# Patient Record
Sex: FEMALE | URBAN - METROPOLITAN AREA
[De-identification: names, ages, dates, MRNs, and addresses within clinical notes are randomized per-mention and may not be internally consistent; named-entity substitution may affect disease eponyms.]

---

## 2023-08-01 ENCOUNTER — APPOINTMENT (RX ONLY)
Dept: URBAN - METROPOLITAN AREA CLINIC 39 | Facility: CLINIC | Age: 78
Setting detail: DERMATOLOGY
End: 2023-08-01

## 2023-08-01 DIAGNOSIS — D22 MELANOCYTIC NEVI: ICD-10-CM

## 2023-08-01 DIAGNOSIS — D18.0 HEMANGIOMA: ICD-10-CM

## 2023-08-01 DIAGNOSIS — L81.4 OTHER MELANIN HYPERPIGMENTATION: ICD-10-CM

## 2023-08-01 PROBLEM — D18.01 HEMANGIOMA OF SKIN AND SUBCUTANEOUS TISSUE: Status: ACTIVE | Noted: 2023-08-01

## 2023-08-01 PROBLEM — D22.5 MELANOCYTIC NEVI OF TRUNK: Status: ACTIVE | Noted: 2023-08-01

## 2023-08-01 PROCEDURE — ? COUNSELING

## 2023-08-01 PROCEDURE — ? FULL BODY SKIN EXAM

## 2023-08-01 PROCEDURE — 99203 OFFICE O/P NEW LOW 30 MIN: CPT

## 2023-08-01 PROCEDURE — ? SUNSCREEN RECOMMENDATIONS

## 2023-08-01 ASSESSMENT — LOCATION SIMPLE DESCRIPTION DERM
LOCATION SIMPLE: RIGHT FOREARM
LOCATION SIMPLE: ABDOMEN
LOCATION SIMPLE: LEFT THIGH
LOCATION SIMPLE: LEFT UPPER BACK

## 2023-08-01 ASSESSMENT — LOCATION DETAILED DESCRIPTION DERM
LOCATION DETAILED: PERIUMBILICAL SKIN
LOCATION DETAILED: LEFT ANTERIOR PROXIMAL THIGH
LOCATION DETAILED: LEFT INFERIOR UPPER BACK
LOCATION DETAILED: RIGHT PROXIMAL RADIAL DORSAL FOREARM

## 2023-08-01 ASSESSMENT — LOCATION ZONE DERM
LOCATION ZONE: LEG
LOCATION ZONE: ARM
LOCATION ZONE: TRUNK

## 2023-08-01 NOTE — PROCEDURE: MIPS QUALITY
Quality 111:Pneumonia Vaccination Status For Older Adults: Patient received any pneumococcal conjugate or polysaccharide vaccine on or after their 60th birthday and before the end of the measurement period
Detail Level: Generalized
Quality 130: Documentation Of Current Medications In The Medical Record: Current Medications Documented
Quality 110: Preventive Care And Screening: Influenza Immunization: Influenza Immunization Administered during Influenza season
Quality 431: Preventive Care And Screening: Unhealthy Alcohol Use - Screening: Patient identified as an unhealthy alcohol user when screened for unhealthy alcohol use using a systematic screening method and received brief counseling
Quality 226: Preventive Care And Screening: Tobacco Use: Screening And Cessation Intervention: Patient screened for tobacco use and is an ex/non-smoker

## 2024-11-06 ENCOUNTER — CARE COORDINATION (OUTPATIENT)
Dept: OTHER | Facility: CLINIC | Age: 79
End: 2024-11-06

## 2024-11-06 RX ORDER — ATORVASTATIN CALCIUM 20 MG/1
20 TABLET, FILM COATED ORAL DAILY
COMMUNITY

## 2024-11-06 RX ORDER — OLMESARTAN MEDOXOMIL AND HYDROCHLOROTHIAZIDE 40/12.5 40; 12.5 MG/1; MG/1
1 TABLET ORAL DAILY
COMMUNITY

## 2024-11-06 NOTE — CARE COORDINATION
Ambulatory Care Coordination Note     2024 2:49 PM     Patient Current Location:  [place]     This patient was received as a referral from Population health report .    ACM contacted the patient by telephone. Verified name and  with patient as identifiers. Provided introduction to self, and explanation of the ACM role.   Patient accepted care management services at this time.          ACM: Rossy Jeong RN     Challenges to be reviewed by the provider   Additional needs identified to be addressed with provider No  none               Method of communication with provider: chart routing.    Utilization: N/A - Initial Call     Care Summary Note: Patient is agreeable to HR program and states her primary health concern is her \"sanity.\" Pt's adult daughter lives with her and has behavioral health conditions causing strain on pt's own mental health, ability to sleep and work. Pt placed her child on phone with ACM and ACM de-escalated conflict which was occurring at time of phone call. Pt provided thorough medical background and medication review completed. She sees PCP Dr. Looney in [place] ([phone]). Pt has periods of incontinence and has history of frequent UTIs. ACM provided education on UTI prevention and she does take cranberry supplements. Pt declines to use Estrace cream due to risk of cancer on medication tube. Explained risk of untreated UTIs including MDROs and possible sepsis over time. Most recent [facility] involved shoulder/arm pain and she was referred to [facility]. Pt went to appt and received exercises to do. Pt states her pain has subsided since 3 days after appt. Pt is open and agreeable to  referral to discuss BH options for her daughter. Pt states she is \"willing to try anything.\" She has crisis line phone number and states she has used many times. Encouraged self-care activities and taking care of her own health. ACM will make referral to  and

## 2024-11-08 ENCOUNTER — CARE COORDINATION (OUTPATIENT)
Dept: OTHER | Facility: CLINIC | Age: 79
End: 2024-11-08

## 2024-11-08 NOTE — CARE COORDINATION
The patient has been identified for outreach by this MSW to provide support and address specific needs. The outreach will focus on engaging the patient to ensure they have access to necessary resources and assistance.      Margaux Goodman, DIANA, [facility]  , [phone]

## 2024-11-12 ENCOUNTER — CARE COORDINATION (OUTPATIENT)
Dept: OTHER | Facility: CLINIC | Age: 79
End: 2024-11-12

## 2024-11-12 NOTE — CARE COORDINATION
Care Coordination MSW Documentation    2024 5:00 PM    MSW contacted patient for evaluation .  Patient identifiers confirmed, zip code and .  Referral sent to  by Nurse Adenike PLASENCIA Wendy, RN.      Purpose of TC  MSW outreached the patient to follow up on social work care coordination. The patient is open to a  referral to explore behavioral health options for her daughter, expressing willingness to try anything to improve the situation.     Patient concerns  She has significant concerns about her daughter's behavior, including suicidal ideation, repeated hospitalizations, and past traumatic experiences with psychiatric intake processes, which have contributed to her deep distrust of healthcare professionals. The patient daughter feels isolated, living in a tense and emotionally challenging environment, and struggles with a lack of support, as well as difficulty trusting anyone in the [place] due to her past experiences and cultural disconnection. All in which has caused some stressors within the family dynamic.     The patient doesn't believe theres any help for her daughter and daughter doesn't want help within the [place] but in [place]. The dtrs current doctor that prescribes her medication [facility].   MSW learned a lot during the conversation and provided recommendations. Patient stated have  done some of the same recommendations in the past.     [place] Mobile Crisis Teams: These teams provide in-the-moment, on-site mental health support and can help assess the situation without necessarily bringing someone to the hospital.  PITA [place]: [employer] [place] [employer] Mental [employer]ness offers support groups for individuals with mental health conditions and their families.    MSW will research and find other alternatives to provide support and assistance.         The patient adopted her dtr from [place] at 20 months old and began receiving mental health services at the age of 9. This early

## 2024-11-15 ENCOUNTER — CARE COORDINATION (OUTPATIENT)
Dept: OTHER | Facility: CLINIC | Age: 79
End: 2024-11-15

## 2024-11-15 SDOH — SOCIAL STABILITY: SOCIAL NETWORK: IN A TYPICAL WEEK, HOW MANY TIMES DO YOU TALK ON THE PHONE WITH FAMILY, FRIENDS, OR NEIGHBORS?: NEVER

## 2024-11-15 SDOH — ECONOMIC STABILITY: FOOD INSECURITY: WITHIN THE PAST 12 MONTHS, YOU WORRIED THAT YOUR FOOD WOULD RUN OUT BEFORE YOU GOT MONEY TO BUY MORE.: NEVER TRUE

## 2024-11-15 SDOH — HEALTH STABILITY: PHYSICAL HEALTH: ON AVERAGE, HOW MANY DAYS PER WEEK DO YOU ENGAGE IN MODERATE TO STRENUOUS EXERCISE (LIKE A BRISK WALK)?: 1 DAY

## 2024-11-15 SDOH — SOCIAL STABILITY: SOCIAL NETWORK
DO YOU BELONG TO ANY CLUBS OR ORGANIZATIONS SUCH AS CHURCH GROUPS UNIONS, FRATERNAL OR ATHLETIC GROUPS, OR SCHOOL GROUPS?: YES

## 2024-11-15 SDOH — ECONOMIC STABILITY: INCOME INSECURITY: IN THE LAST 12 MONTHS, WAS THERE A TIME WHEN YOU WERE NOT ABLE TO PAY THE MORTGAGE OR RENT ON TIME?: YES

## 2024-11-15 SDOH — HEALTH STABILITY: MENTAL HEALTH: HOW MANY STANDARD DRINKS CONTAINING ALCOHOL DO YOU HAVE ON A TYPICAL DAY?: 1 OR 2

## 2024-11-15 SDOH — ECONOMIC STABILITY: INCOME INSECURITY: HOW HARD IS IT FOR YOU TO PAY FOR THE VERY BASICS LIKE FOOD, HOUSING, MEDICAL CARE, AND HEATING?: VERY HARD

## 2024-11-15 SDOH — HEALTH STABILITY: MENTAL HEALTH
STRESS IS WHEN SOMEONE FEELS TENSE, NERVOUS, ANXIOUS, OR CAN'T SLEEP AT NIGHT BECAUSE THEIR MIND IS TROUBLED. HOW STRESSED ARE YOU?: RATHER MUCH

## 2024-11-15 SDOH — SOCIAL STABILITY: SOCIAL NETWORK: ARE YOU MARRIED, WIDOWED, DIVORCED, SEPARATED, NEVER MARRIED, OR LIVING WITH A PARTNER?: NEVER MARRIED

## 2024-11-15 SDOH — ECONOMIC STABILITY: FOOD INSECURITY: WITHIN THE PAST 12 MONTHS, THE FOOD YOU BOUGHT JUST DIDN'T LAST AND YOU DIDN'T HAVE MONEY TO GET MORE.: NEVER TRUE

## 2024-11-15 SDOH — HEALTH STABILITY: MENTAL HEALTH: HOW OFTEN DO YOU HAVE A DRINK CONTAINING ALCOHOL?: MONTHLY OR LESS

## 2024-11-15 SDOH — SOCIAL STABILITY: SOCIAL NETWORK: HOW OFTEN DO YOU GET TOGETHER WITH FRIENDS OR RELATIVES?: NEVER

## 2024-11-15 SDOH — SOCIAL STABILITY: SOCIAL NETWORK: HOW OFTEN DO YOU ATTEND CHURCH OR RELIGIOUS SERVICES?: NEVER

## 2024-11-15 SDOH — SOCIAL STABILITY: SOCIAL NETWORK: HOW OFTEN DO YOU ATTENT MEETINGS OF THE CLUB OR ORGANIZATION YOU BELONG TO?: MORE THAN 4 TIMES PER YEAR

## 2024-11-15 SDOH — HEALTH STABILITY: PHYSICAL HEALTH: ON AVERAGE, HOW MANY MINUTES DO YOU ENGAGE IN EXERCISE AT THIS LEVEL?: 30 MIN

## 2024-11-15 NOTE — CARE COORDINATION
Ambulatory Care Coordination Note     11/15/2024 2:42 PM     Patient Current Location:  [place]     ACM contacted the patient by telephone. Verified name and  with patient as identifiers.         ACM: Rossy Jeong RN     Challenges to be reviewed by the provider   Additional needs identified to be addressed with provider No  none               Method of communication with provider: chart routing.    Utilization: Patient has not had any utilization since our last call.     Care Summary Note: Patient reports her adult child's verbally aggressive behavior waxes and wanes. She is at work today and spoke with ACM, giving time for therapeutic listening. ACM offered suggestions for maintaining healthy boundaries in her home and alerting authorities for threats of any harm. Pt states PCP will not prescribe medications for depression and wants her to see a Psychiatrist for this. She does speak with a counselor virtually and states psychiatrist was offered but not successful in establishing care. ACM offered suggestion of primary care offerings at [facility] and will consider as she was pleased with care in the past for acute need from Dr. Trent Self. ACM provided office hours over the following week and offered to contact [facility] to help provide a smooth initial visit. Patient will consider this and let ACM know. Pt completed SDOH assessment and needs were identified. Pt is agreeable to SW researching any other options which may be of benefit for her in addition to further  resources for her child. Pt works as a relator and at a store part-time. She also mentioned uncertainty about ACP documents with respect to health care decision-maker. Pt is agreeable to CM follow-up call in 2-3 weeks and expressed thanks for the call.       Offered patient enrollment in the Remote Patient Monitoring (RPM) program for in-home monitoring: Patient is not eligible for RPM program because: affiliate provider.

## 2024-11-20 ENCOUNTER — CARE COORDINATION (OUTPATIENT)
Dept: OTHER | Facility: CLINIC | Age: 79
End: 2024-11-20

## 2024-11-20 NOTE — CARE COORDINATION
Ambulatory Care Coordination Note     11/20/2024 2:22 PM     Pt returned call and expressed worry about daughter's mental health. Pt states she called crisis line but this was no help. Pt declined speaking with SW at this time. ACM provided active and empathetic listening, offering suggestions for encouraging her daughter to adhere to medication regimen, take PRNs as needed, and to contact her  prescriber to request med review. Discussed safety and contacting authorities if pt feels her daughter is a harm to her self or others. Pt. expressed thanks for call. Encouraged pt to reach out as needed.     Rossy Jeong RN BSN  [phone]

## 2024-11-20 NOTE — CARE COORDINATION
Ambulatory Care Coordination Note     11/20/2024 1:25 PM     Returned call to pt and she asked if she can return my call in 15 minutes. Will await pt's call.    Rossy Jeong RN BSN  [phone]

## 2024-11-21 ENCOUNTER — CARE COORDINATION (OUTPATIENT)
Dept: OTHER | Facility: CLINIC | Age: 79
End: 2024-11-21

## 2024-11-21 NOTE — CARE COORDINATION
Care Coordination MSW Documentation    2024 2:46 PM    MSW contacted patient for follow up.  Patient identifiers confirmed, zip code and .  Patient referred by Nurse Adenike PLASENCIA Wendy, RN.      Purpose of TC  MSW outreached the patient to follow up on social work care coordination. MSW inquired about financial assistance needs, assess any ongoing social support concerns, and provide guidance on accessing community resources.     TC Details  The patient shared that it has been challenging to talk at home, as she is unsure when her daughter may be listening. She disclosed that her daughter has expressed suicidal ideation (SI) and [facility] support, but she reported they were unable to assist her. The patient described yesterdays incident where her daughter entered the room, began screaming, took the phone from the patient, and yelled at the hotline representatives, stating it was because they were not . He expressed that this situation has been ongoing for three days, leading to a pounding headache and difficulty concentrating.    The daughter stated that she would like to involve a  to help address the situation. The MSW asked how she could assist both the patient and her daughter, exploring ways to provide support, connect them to appropriate resources, and help create a more stable and communicative environment. The MSW acknowledged the stress both parties are experiencing and discussed potential next steps to address their needs, including mediation or family support services in and out of state.    Plan of action  Identify and recommend a trained  or family therapist to facilitate communication between the patient and her daughter. Find outside resources. MSW provided contact information for future needs. Plan for follow-up call in 10-14 days

## 2024-11-25 ENCOUNTER — CARE COORDINATION (OUTPATIENT)
Dept: OTHER | Facility: CLINIC | Age: 79
End: 2024-11-25

## 2024-11-25 NOTE — CARE COORDINATION
Care Coordination MSW Documentation    2024 2:22 PM    MSW contacted patient for follow up.  Patient identifiers confirmed, zip code and .  Patient referred by Nurse Adenike PLASENCIA Wendy, RN.      Purpose of TC  MSW will text patient with requested resources that can help with daughters mental health.     TC Details  Hello,   I hope this message finds you well. I wanted to share some resources for Partial Hospitalization Programs (PHPs):  [facility]:  Offers PHPs for adults, adolescents, and children.  Locations in [place], including [place] and [place].  Phone: [phone]  Website: [email or web address]  [facility]:  Provides PHPs and other mental health and substance use services for teens and young adults.  Website: [email or web address]  Please feel free to reach out if you have any questions or need additional assistance.  Best regards,    DIANA Spaulding, [facility]  Social Work Care Coordinator      Plan of action  MSW provided contact information for future needs. Plan for follow-up call in 7-10 days

## 2024-12-12 ENCOUNTER — CARE COORDINATION (OUTPATIENT)
Dept: OTHER | Facility: CLINIC | Age: 79
End: 2024-12-12

## 2024-12-12 NOTE — CARE COORDINATION
Ambulatory Care Coordination Note     12/12/2024 10:52 AM     Patient outreach attempt by this ACM today to perform care management follow up . ACM was unable to reach the patient by telephone today;   left voice message requesting a return phone call to this ACM.     ACM: Rossy Jeong RN     Care Summary Note: Unable to reach patient at this time.        Follow Up:   Plan for next ACM outreach in approximately 1 month to complete:  - goal progression.     Rossy Jeong RN BSN  [phone]

## 2024-12-12 NOTE — CARE COORDINATION
Ambulatory Care Coordination Note     2024 3:53 PM     Patient Current Location:  [place]     ACM contacted the patient by telephone. Verified name and  with patient as identifiers.         ACM: Rossy Jeong RN     Challenges to be reviewed by the provider   Additional needs identified to be addressed with provider No  none               Method of communication with provider: phone.    Utilization: Patient has not had any utilization since our last call.     Care Summary Note: Pt continues to state her main concern is the mental well-being of her daughter and herself. Pt states no change in her daughter's behavioral status. Discussed coping mechanisms and pt reports getting involved again in PITA group and attending senior center activities. Pt speaks with her counselor weekly. ACM provided empathic listening and pt expressed thanks for time and the call. Pt states no physical issues at this time. Pt is agreeable to follow-up call in one month.     Offered patient enrollment in the Remote Patient Monitoring (RPM) program for in-home monitoring: Patient is not eligible for RPM program because: affiliate provider.     Assessments Completed:   General Assessment    Do you have any symptoms that are causing concern?: No          Medications Reviewed:   Completed during a previous call     Advance Care Planning:   Reviewed during previous call      Care Planning:    Goals Addressed                   This Visit's Progress     Conditions and Symptoms   On track     I will schedule office visits, as directed by my provider.  I will keep my appointment or reschedule if I have to cancel.  I will notify my provider of any barriers to my plan of care.  I will notify my provider of any symptoms that indicate a worsening of my condition.    Barriers: lack of support, overwhelmed by complexity of regimen, and stress  Plan for overcoming my barriers: working with ACM  Confidence: 8/10  Anticipated Goal Completion

## 2025-01-02 ENCOUNTER — CARE COORDINATION (OUTPATIENT)
Dept: OTHER | Facility: CLINIC | Age: 80
End: 2025-01-02

## 2025-01-02 NOTE — CARE COORDINATION
MSW contacted patient for follow up.  Patient identifiers confirmed, zip code and .  Patient referred by Nurse Adenike PLASENCIA Wendy, RN.        Purpose of TC  MSW outreach to patient to follow up after receiving referral that patient would like community resources due to daughter mental health and needing adequte resources.    TC Details  MSW attempted to contact the patient via phone, but the call was not answered. MSW left a HIPAA compliant Vmail.         Plan of action  MSW will make attempt to follow up next week. Plan for follow-up call in 5-7 days

## 2025-01-09 ENCOUNTER — CARE COORDINATION (OUTPATIENT)
Dept: OTHER | Facility: CLINIC | Age: 80
End: 2025-01-09

## 2025-01-09 NOTE — CARE COORDINATION
Care Coordination MSW Documentation    2025 12:50 PM    MSW contacted patient for follow up.  Patient identifiers confirmed, zip code and .  Patient referred by Nurse Adenike PLASENCIA Wendy, RN.      Purpose of TC  MSW attempted to outreach the patient to follow up on social work care coordination. MSW to inquired about assistance needs, assess any ongoing social support concerns, and provide guidance on accessing community resources.     TC Details  Left a voicemail requesting a callback to further discuss the patient's current situation and ensure continuity of care.    Below are the resources:     Medical-Legal Partnerships (MLPs) integrate legal services into healthcare settings to address social determinants affecting health. In [place], several MLPs provide services that might be accessible from [place]:  1. Medical-Legal Partnership Clinic at the [employer]  Services: Offers free civil legal services to low-income individuals living with HIV, addressing issues like disability benefits, custody, employment discrimination, and advance care planning.  Location: [place], [place]  Contact: Phone: [phone]  Website: Medical-Legal Partnership Clinic  2. [employer] (Health, Education, Advocacy, and Law) at the [facility]  Services: Provides advocacy and legal services for children with disabilities and their families, focusing on special education, healthcare access, and family law.  Location: [place], [place]  Contact: Phone: [phone]  Website: [employer]  3.  [facility] for Medical-Legal Partnership  Services: Provides resources and support for establishing and maintaining MLPs nationwide.  Website: [facility] for Medical-Legal Partnership    Plan of action  These resources can be provided by myself or [employer] during next outreach. MSW contact information for future needs. Plan for follow-up call in 7-10 days

## 2025-01-15 ENCOUNTER — CARE COORDINATION (OUTPATIENT)
Dept: OTHER | Facility: CLINIC | Age: 80
End: 2025-01-15

## 2025-01-15 NOTE — CARE COORDINATION
Ambulatory Care Coordination Note     1/15/2025 2:06 PM     Patient Current Location:  [place]     ACM contacted the patient by telephone. Verified name and  with patient as identifiers.         ACM: Rossy Jeong RN     Challenges to be reviewed by the provider   Additional needs identified to be addressed with provider No  none               Method of communication with provider: chart routing.    Utilization: Patient has not had any utilization since our last call.     Care Summary Note: Pt states no changes to situation with challenges concerning her adult daughter. Pt agreed to one-time email for AC to send her resources provided by . Pt agreeable to follow-up call in one month.    Offered patient enrollment in the Remote Patient Monitoring (RPM) program for in-home monitoring: Patient is not eligible for RPM program because: affiliate provider.     Assessments Completed:   General Assessment    Do you have any symptoms that are causing concern?: No          Medications Reviewed:   Completed during a previous call     Advance Care Planning:   Reviewed during previous call      Care Planning:   Not completed during this call      Follow Up:   Plan for next ACM outreach in approximately 1 month to complete:  - goal progression  - resources emailed .   Patient  is agreeable to this plan.     Rossy Jeong RN BSN  Ambulatory Care Manager  [phone]  [email or web address]

## 2025-01-15 NOTE — CARE COORDINATION
Ambulatory Care Coordination Note     1/15/2025 2:13 PM     Emailed resources to pt with her permission:    Ariel Alford,  Here are the resources I mentioned on our call today, which our  has researched.  I hope they provide you with some helpful support.  Please reach out if there's anything else we can assist you with.    Medical-Legal Partnerships (MLPs) integrate legal services into healthcare settings to address social determinants affecting health. In [place], several MLPs provide services that might be accessible from [place]:  1. Medical-Legal Partnership Clinic at the [facility] [employer]  Services: Offers free civil legal services to low-income individuals living with HIV, addressing issues like disability benefits, custody, employment discrimination, and advance care planning.  Location: [place], [place]  Contact: Phone: [phone]  Website: Medical-Legal Partnership Clinic  2. [employer] (Health, Education, Advocacy, and Law) at the [facility]  Services: Provides advocacy and legal services for children with disabilities and their families, focusing on special education, healthcare access, and family law.  Location: [place], [place]  Contact: Phone: [phone]  Website: [employer]  3. National [facility] for Medical-Legal Partnership  Services: Provides resources and support for establishing and maintaining MLPs nationwide.  Website: [facility] for Medical-Legal Partnership  Thank you,    Rossy Jeong RN BSN  Ambulatory Care Manager  [facility]   Cell [phone]  [email or web address]

## 2025-01-29 ENCOUNTER — CARE COORDINATION (OUTPATIENT)
Dept: OTHER | Facility: CLINIC | Age: 80
End: 2025-01-29

## 2025-01-29 NOTE — CARE COORDINATION
Care Coordination MSW Documentation    1/29/2025 2:49 PM      MSW made 2nd attempted to outreach/contact the patient to follow up on social work care coordination. The call was intended to check in on housing or financial assistance needs, assess any ongoing social support concerns, and provide guidance on accessing community resources. MSW left a HIPAA compliant voicemail as the Will attempt to follow up at a later time.       DIANA Spaulding, [facility]  , [phone]

## 2025-02-20 ENCOUNTER — CARE COORDINATION (OUTPATIENT)
Dept: OTHER | Facility: CLINIC | Age: 80
End: 2025-02-20

## 2025-02-20 NOTE — CARE COORDINATION
Ambulatory Care Coordination Note     2/20/2025 10:28 AM     Called pt and she states \"things are going to hell very quickly, but I don't have time to talk today.\" Pt requested ACM call her tomorrow. Will plan to outreach on 02/21/25.    Rossy Jeong RN BSN  [phone]

## 2025-02-21 ENCOUNTER — CARE COORDINATION (OUTPATIENT)
Dept: OTHER | Facility: CLINIC | Age: 80
End: 2025-02-21

## 2025-02-21 NOTE — CARE COORDINATION
Ambulatory Care Coordination Note     2/21/2025 12:55 PM     Patient outreach attempt by this ACM today to perform care management follow up . ACM was unable to reach the patient by telephone today;   left voice message requesting a return phone call to this ACM.     ACM: Rossy Jeong RN     Care Summary Note: Unable to reach patient at this time.          Follow Up:   Plan for next ACM outreach in approximately 2 weeks to complete:  - goal progression.      Rossy Jeong RN BSN  [phone]

## 2025-02-27 ENCOUNTER — CARE COORDINATION (OUTPATIENT)
Dept: OTHER | Facility: CLINIC | Age: 80
End: 2025-02-27

## 2025-02-27 NOTE — CARE COORDINATION
MSW made an outreach attempt today to perform a care management follow-up. MSW was unable to reach the patient by telephone and left a voicemail requesting a return call.    Patient was closed from the Social Work Care Management program on  2/27/2025 due to disengagement. At this time, the patient is deemed able to self-manage.    MSW provided contact information for future needs. No further follow-up call indicated

## 2025-03-06 ENCOUNTER — CARE COORDINATION (OUTPATIENT)
Dept: OTHER | Facility: CLINIC | Age: 80
End: 2025-03-06

## 2025-03-06 NOTE — CARE COORDINATION
Ambulatory Care Coordination Note     3/6/2025 1:43 PM     Pt states she is getting ready to leave for work and her knee is bothering her, and that issues at home are affecting her health. Pt states she is able to talk with ACM on Tuesday between noon and 2pm. Will plan to outreach at that time.    Rossy Jeong RN BSN  [phone]

## 2025-03-11 ENCOUNTER — CARE COORDINATION (OUTPATIENT)
Dept: OTHER | Facility: CLINIC | Age: 80
End: 2025-03-11

## 2025-03-11 NOTE — CARE COORDINATION
Ambulatory Care Coordination Note     3/11/2025 2:06 PM     Pt requested one time email:    Ariel Alford,  Here is the link to see the provider schedule for the [place] location.     [email or web address]    When you have a date and time of when you'd like to visit, just let me know, and I will notify them of when you plan to come in.     Hope you have a good day,     Rossy Jeong RN BSN  Ambulatory Care Manager  [facility] [phone]  [email or web address]

## 2025-03-11 NOTE — CARE COORDINATION
Ambulatory Care Coordination Note     3/11/2025 1:42 PM     Patient Current Location:  [place]     ACM contacted the patient by telephone. Verified name and  with patient as identifiers.         ACM: Rossy Jeong RN     Challenges to be reviewed by the provider   Additional needs identified to be addressed with provider Yes  bilateral knee and leg pain, not sleeping through the night due to urinary frequency, ears feeling \"plugged\"               Method of communication with provider: staff message.    Utilization: Patient has not had any utilization since our last call.     Care Summary Note: Pt states she is \"doing very badly, and in a bad place physically and mentally.\" She reports having bilateral knee and leg pain, not sleeping through the night due to urinary frequency, and ears feeling \"plugged,\" in addition to high levels of stress related to adult child's mental health issues. 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 Pt denies pain or burning with urination and does not think she has a UTI as she has had many in the past and is taking cranberry supplements. Pt states she gets up to urinate at least every two hours through the night. This prevents restful sleep in addition to adult child waking her up \"to yell at her.\" Pt states her hearing is not affected, but feels her ears are \"plugged\" at times, and wonders if stress is causing this. ACM explained this could be related, but best to be assessed by a health care provider. Discussed option of [facility] and encouraged pt to consider them for primary care as well, for improved care coordination and access to care. Pt is agreeable to considering this. Pt is closest to [facility] and pt requested link be sent to her via email to see provider schedule. Once pt has decided on a time to visit, she will

## 2025-03-25 ENCOUNTER — CARE COORDINATION (OUTPATIENT)
Dept: OTHER | Facility: CLINIC | Age: 80
End: 2025-03-25

## 2025-03-25 NOTE — CARE COORDINATION
Provider: Yadira  Week 1 - Initial Assessment     Do you have all of your prescriptions and are they filled?: Yes  Barriers to medication adherence: Does not feel there is a need/No perceived effect  Are you able to afford your medications?: Yes  How often do you have trouble taking your medications the way you have been told to take them?: I always take them as prescribed.        Ability to seek help/take action for Emergent Urgent situations i.e. fire, crime, inclement weather or health crisis.: Independent  Ability to ambulate to restroom: Independent  Ability handle personal hygeine needs (bathing/dressing/grooming): Independent  Ability to manage Medications: Independent  Ability to prepare Food Preparation: Independent  Ability to maintain home (clean home, laundry): Independent  Ability to drive and/or has transportation: Independent  Ability to do shopping: Independent  Ability to manage finances: Independent  Is patient able to live independently?: Yes     Current Housing: Private Residence  Who do you live with?: Child  Are you an active caregiver in your home?: Yes  For whom are you the caregiver?: Adult daughter  Does the person that you care for see a [facility] PCP?: No                 Thinking about your patient's physical health needs, are there any symptoms or problems (risk indicators) you are unsure about that require further investigation?: Moderate to severe symptoms or problems that impact on daily life   Are the patient’s physical health problems impacting on their mental well-being?: Moderate to severe impact upon mental well-being and preventing enjoyment of usual activities   Are there any problems with your patient’s lifestyle behaviors (alcohol, drugs, diet, exercise) that are impacting on physical or mental well-being?: Moderate to severe impact on well-being, preventing enjoyment of usual activities   Do you have any other concerns about your patient’s mental well-being? How would you rate their

## 2025-04-22 ENCOUNTER — CARE COORDINATION (OUTPATIENT)
Dept: OTHER | Facility: CLINIC | Age: 80
End: 2025-04-22

## 2025-04-22 NOTE — CARE COORDINATION
Ambulatory Care Coordination Note     4/22/2025 12:58 PM     Called and spoke with Charlene at [facility] to inquire about Reasonable Accommodations Questionnaire. Paperwork is pending signature from provider. AC requested they call pt as she will come in to  when ready.     Rossy Jeong RN BSN  [phone]

## 2025-04-22 NOTE — CARE COORDINATION
Ambulatory Care Coordination Note     4/22/2025 1:05 PM     Called pt and left message informing her that Reasonable Accommodations paperwork is pending signature from physician and ACM requested they call her cell phone number to let her know when ready, so she may pick it up. Asked pt to call if any questions.    Rossy Jeong RN BSN  [phone]

## 2025-04-22 NOTE — CARE COORDINATION
Ambulatory Care Coordination Note     2025 12:45 PM     Patient Current Location:  [place]     ACM contacted the patient by telephone. Verified name and  with patient as identifiers.         ACM: Rossy Jeong RN     Challenges to be reviewed by the provider   Additional needs identified to be addressed with provider No  none               Method of communication with provider: chart routing.    Utilization: Patient has not had any utilization since our last call.     Care Summary Note: Pt reports she continues to have bilateral knee pain and she has followed up with [facility]. Pt will start PT on 25 2x/week. Pt is having difficulty standing for long periods of time at her second job where she stands for 6 hours at a time. She has requested Orthopedics complete a Reasonable Accommodations Questionnaire so she may send in to her employer. Pt is agreeable to [facility] calling Ortho office ([phone]) to inquire if this is ready for . Pt also states she is having to get up at night about every hour to urinate. She denies pain or burning with urination and states she has seen Urology in the past. AC recommended she schedule follow-up appt with Urology. Also discusses avoiding drinking fluids in the evening before bed, as well as limiting caffeine intake. Pt was thankful for assistance, Will plan to follow-up with pt once information obtained from Orthopedics.     Offered patient enrollment in the Remote Patient Monitoring (RPM) program for in-home monitoring: Patient is not eligible for RPM program because: affiliate provider.     Assessments Completed:   General Assessment    Do you have any symptoms that are causing concern?: No          Medications Reviewed:   Completed during a previous call     Advance Care Planning:   Reviewed during previous call      Care Planning:    Goals Addressed                   This Visit's Progress     Conditions and Symptoms   On track     I will

## 2025-05-21 ENCOUNTER — CARE COORDINATION (OUTPATIENT)
Dept: OTHER | Facility: CLINIC | Age: 80
End: 2025-05-21

## 2025-05-21 NOTE — CARE COORDINATION
Ambulatory Care Coordination Note     2025 1:39 PM     Patient Current Location:  [place]     Patient contacted the ACM by telephone. Verified name and  with patient as identifiers.         ACM: Rossy Jeong RN     Challenges to be reviewed by the provider   Additional needs identified to be addressed with provider No  none               Method of communication with provider: chart routing.    Utilization: Patient has not had any utilization since our last call.     Care Summary Note: Pt reports she is going to PT now for knee pain and she does not feel this is helpful yet, but will continue with treatment. Pt states she does not have time to complete HEP and continues to have emotional disturbances due to concerns with adult daughter's behavior. Pt states she did receive paperwork back from Orthopedics for reasonable accommodations for her job at [employer]. She is waiting for response from her employer. Pt reports she continues to have frequent urination at night, waking 3-4 times per night. She does not feel symptoms are as troublesome during the day. ACM provided education on new medications/treatment options and encouraged her to follow-up again with Urology. Pt requested information be emailed to her along with Urologists in [facility]. Reviewed upcoming 6 month time frame of time in CM program and will review for graduation in . Pt voiced understanding and was thankful for the call.       Offered patient enrollment in the Remote Patient Monitoring (RPM) program for in-home monitoring: Patient is not eligible for RPM program because: affiliate provider.     Assessments Completed:   General Assessment    Do you have any symptoms that are causing concern?: Yes  Progression since Onset: Unchanged  Reported Symptoms: Other (Comment: 25 Frequent night time urination)          Medications Reviewed:   Completed during a previous call     Advance Care Planning:   Reviewed during previous call

## 2025-05-21 NOTE — CARE COORDINATION
Ambulatory Care Coordination Note     5/21/2025 2:07 PM     Secure Email to Patient:    Ariel Alford,  As per our discussion today, this link from the [facility] explains treatment options for symptoms of overactive bladder.   Look under the heading “Treatment.”    [email or web address]    Here is a list of Urologists in your area:    [facility]   [address], [place] [id number] [phone]    [facility]    [address], [place] [address]   [phone]    Dr. Choa Bee MD  [address] [place] [address]  [phone]    Dr. Chao Vazquez MD  [address] , [place] [address] [phone]    2nd location:  [address] [place] [address] , [place] [address]      I look forward to talking with you next month.   Please call if you have any questions.    Thank you,     Rossy Jeong RN BSN  Ambulatory Care Manager  [facility] [phone]  [email or web address]

## 2025-05-21 NOTE — CARE COORDINATION
Ambulatory Care Coordination Note     5/21/2025 10:47 AM     Patient outreach attempt by this AC today to perform care management follow up . ACM was unable to reach the patient by telephone today;   left voice message requesting a return phone call to this ACM.     ACM: Rossy Jeong RN     Care Summary Note: Unable to reach patient at this time.        Follow Up:   Plan for next AC outreach in approximately 3 weeks to complete:  - goal progression  - follow-up appointment with Orthopedics, Urology  -Review for graduation .      Rossy Jeong RN BSN  [phone]

## 2025-06-17 ENCOUNTER — CARE COORDINATION (OUTPATIENT)
Dept: OTHER | Facility: CLINIC | Age: 80
End: 2025-06-17

## 2025-06-17 NOTE — CARE COORDINATION
Ambulatory Care Coordination Note     6/17/2025 3:24 PM     Patient outreach attempt by this ACM today to perform care management follow up . ACM was unable to reach the patient by telephone today;   left voice message requesting a return phone call to this ACM.     ACM: Rossy Jeong RN     Care Summary Note: Unable to reach patient at this time.        Follow Up:   Plan for next AC outreach in approximately 1 week to complete:  Review for graduation .        Rossy Jeong RN BSN  [phone]

## 2025-06-23 ENCOUNTER — CARE COORDINATION (OUTPATIENT)
Dept: OTHER | Facility: CLINIC | Age: 80
End: 2025-06-23

## 2025-06-23 NOTE — CARE COORDINATION
Ambulatory Care Coordination Note     2025 3:29 PM     Patient Current Location:  [facility] contacted the patient by telephone. Verified name and  with patient as identifiers.     Patient graduated from the High Risk Care Management program on 2025.  Patient has the ability to self-manage at this time..  Care management goals have been completed. No further Ambulatory Care Manager follow up scheduled.      ACM: Rossy Jeong RN     Challenges to be reviewed by the provider   Additional needs identified to be addressed with provider No  none               Method of communication with provider: chart routing.    Utilization: Patient has not had any utilization since our last call.     Care Summary Note: Pt states she is doing a little better today. Her knees are slightly less painful and she has been going to PT 2x/week. Pt continues to c/o urinary frequency, waking at least hourly to go to the bathroom. Pt is unsure if she received email from [facility] for list of Urologists and states she does not wish to follow-up on this. Pt's main concerns are financial stability and having a peaceful living situation. Emotional stress continues due to adult daughter's BH needs. Provided extensive empathic listening and offered suggestions for increasing social interactions, SDOH resources, and looking to the future to promote thought about improved living situation. Pt has the ability to manage her own care and makes decisions independently, although does not always follow-through with suggested resources. Pt has had no ED/IP utilization in the last six months. Will graduate pt from HRCM program today. Pt knowledgeable about red flags relevant to condition, and when/where to seek treatment. Encouraged pt to call [facility] with any future CM needs.     Offered patient enrollment in the Remote Patient Monitoring (RPM) program for in-home monitoring: Patient is not eligible for RPM program because: affiliate provider.